# Patient Record
Sex: MALE | Race: WHITE | NOT HISPANIC OR LATINO
[De-identification: names, ages, dates, MRNs, and addresses within clinical notes are randomized per-mention and may not be internally consistent; named-entity substitution may affect disease eponyms.]

---

## 2024-05-01 ENCOUNTER — APPOINTMENT (OUTPATIENT)
Dept: UROLOGY | Facility: CLINIC | Age: 34
End: 2024-05-01
Payer: COMMERCIAL

## 2024-05-01 VITALS — SYSTOLIC BLOOD PRESSURE: 160 MMHG | DIASTOLIC BLOOD PRESSURE: 99 MMHG | TEMPERATURE: 97.5 F | HEART RATE: 66 BPM

## 2024-05-01 DIAGNOSIS — R82.998 OTHER ABNORMAL FINDINGS IN URINE: ICD-10-CM

## 2024-05-01 PROBLEM — Z00.00 ENCOUNTER FOR PREVENTIVE HEALTH EXAMINATION: Status: ACTIVE | Noted: 2024-05-01

## 2024-05-01 PROCEDURE — 99204 OFFICE O/P NEW MOD 45 MIN: CPT

## 2024-05-04 LAB
APPEARANCE: CLEAR
BACTERIA UR CULT: NORMAL
BACTERIA: NEGATIVE /HPF
BILIRUBIN URINE: NEGATIVE
BLOOD URINE: NEGATIVE
CAST: 0 /LPF
COLOR: YELLOW
EPITHELIAL CELLS: 0 /HPF
GLUCOSE QUALITATIVE U: NEGATIVE MG/DL
KETONES URINE: NEGATIVE MG/DL
LEUKOCYTE ESTERASE URINE: NEGATIVE
MICROSCOPIC-UA: NORMAL
NITRITE URINE: NEGATIVE
PH URINE: 6.5
PROTEIN URINE: NEGATIVE MG/DL
RED BLOOD CELLS URINE: 0 /HPF
SPECIFIC GRAVITY URINE: 1.01
UROBILINOGEN URINE: 0.2 MG/DL
WHITE BLOOD CELLS URINE: 0 /HPF

## 2024-05-04 NOTE — ASSESSMENT
[FreeTextEntry1] : 34 year old with 2 RBC/hpf and uric acid crystals in urine. Completely asymptomatic, no stone history. We discussed AUA guidelines for microhematuria. Microhematuria is defined as define microhematuria as 3 red blood cells per high-power field on microscopic evaluation of a single, properly collected urine specimen. Patient does not meet criteria. Work-up not indicated at this time. Also discussed crystalluria in absence of stones not worrisome. Offered renal US today - patient would like to hold off for now but if present on repeat urine will obtain.  - UA, culture - if above wnl fu as needed - if crystals still present will proceed with US to ro stones - no PCP --> discussed establishing care with Dr. Sánchez

## 2024-05-04 NOTE — HISTORY OF PRESENT ILLNESS
[FreeTextEntry1] : Name SOLOMON LAIRD  MRN [id number]   1990  Contact Number: [phone] ----------------------------------------------------------------------------------------------------------------------------------------- Date of First Visit: 24 Referring Provider/PCP: none -----------------------------------------------------------------------------------------------------------------------------------------  CC: microhematuria, uric acid crystals  History of Present Illness: SOLOMON LAIRD is a 34 year old male who presents for evaluation uric acid crystals. UA 24 blood+ but 0-2 RBC, uric acid crystal moderate. Patient reports had fever, vomiting, diarrhea at the time and went to urgent care. Reports was very dehtdrated at the time but no urinary symptoms. Denies history of urinary issues, history of kidney stones.  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 Personal history of cancer: no  History of cyclophosphamide/ifosfamide chemotherapy: no Family history of urothelial or other genitourinary cancers: no  IPSS 2 QOL 0 SONU 19  PMH: HTN, asthma PSH: none Family History: no  malignancies Social: , no children, runs an [employer], never smoker, social alcohol, +marijuana Meds: amlodipine, QVAR redihaler, albuterol Allergies: NKDA ROS: no current fevers, chills, flank pain ----------------------------------------------------------------------------------------------------------------------------------------- 24: UA nitrite neg LE neg occult blood, 0-2 RBC, + uric acid crystals

## 2024-05-08 ENCOUNTER — NON-APPOINTMENT (OUTPATIENT)
Age: 34
End: 2024-05-08

## 2024-05-14 ENCOUNTER — NON-APPOINTMENT (OUTPATIENT)
Age: 34
End: 2024-05-14

## 2024-05-15 ENCOUNTER — APPOINTMENT (OUTPATIENT)
Dept: INTERNAL MEDICINE | Facility: CLINIC | Age: 34
End: 2024-05-15
Payer: COMMERCIAL

## 2024-05-15 VITALS
SYSTOLIC BLOOD PRESSURE: 148 MMHG | BODY MASS INDEX: 29.12 KG/M2 | TEMPERATURE: 98.2 F | HEIGHT: 72 IN | HEART RATE: 68 BPM | DIASTOLIC BLOOD PRESSURE: 88 MMHG | WEIGHT: 215 LBS

## 2024-05-15 DIAGNOSIS — Z78.9 OTHER SPECIFIED HEALTH STATUS: ICD-10-CM

## 2024-05-15 DIAGNOSIS — Z82.69 FAMILY HISTORY OF OTHER DISEASES OF THE MUSCULOSKELETAL SYSTEM AND CONNECTIVE TISSUE: ICD-10-CM

## 2024-05-15 DIAGNOSIS — R63.5 ABNORMAL WEIGHT GAIN: ICD-10-CM

## 2024-05-15 DIAGNOSIS — Z87.09 PERSONAL HISTORY OF OTHER DISEASES OF THE RESPIRATORY SYSTEM: ICD-10-CM

## 2024-05-15 DIAGNOSIS — Z82.61 FAMILY HISTORY OF ARTHRITIS: ICD-10-CM

## 2024-05-15 DIAGNOSIS — Z82.49 FAMILY HISTORY OF ISCHEMIC HEART DISEASE AND OTHER DISEASES OF THE CIRCULATORY SYSTEM: ICD-10-CM

## 2024-05-15 DIAGNOSIS — I10 ESSENTIAL (PRIMARY) HYPERTENSION: ICD-10-CM

## 2024-05-15 PROCEDURE — G0447 BEHAVIOR COUNSEL OBESITY 15M: CPT | Mod: 59

## 2024-05-15 PROCEDURE — 99203 OFFICE O/P NEW LOW 30 MIN: CPT | Mod: 25

## 2024-05-15 PROCEDURE — 36415 COLL VENOUS BLD VENIPUNCTURE: CPT

## 2024-05-15 PROCEDURE — G0444 DEPRESSION SCREEN ANNUAL: CPT | Mod: 59

## 2024-05-15 PROCEDURE — G2211 COMPLEX E/M VISIT ADD ON: CPT | Mod: NC,1L

## 2024-05-15 RX ORDER — ALBUTEROL SULFATE 90 UG/1
108 (90 BASE) INHALANT RESPIRATORY (INHALATION)
Qty: 54 | Refills: 3 | Status: ACTIVE | COMMUNITY
Start: 2024-05-15 | End: 1900-01-01

## 2024-05-15 RX ORDER — BECLOMETHASONE DIPROPIONATE HFA 40 UG/1
40 AEROSOL, METERED RESPIRATORY (INHALATION)
Refills: 0 | Status: DISCONTINUED | COMMUNITY
End: 2024-05-15

## 2024-05-15 RX ORDER — AMLODIPINE AND OLMESARTAN MEDOXOMIL 5; 40 MG/1; MG/1
5-40 TABLET ORAL
Refills: 0 | Status: ACTIVE | COMMUNITY

## 2024-05-15 RX ORDER — BECLOMETHASONE DIPROPIONATE HFA 40 UG/1
40 AEROSOL, METERED RESPIRATORY (INHALATION) TWICE DAILY
Qty: 1 | Refills: 3 | Status: ACTIVE | COMMUNITY
Start: 2024-05-15 | End: 1900-01-01

## 2024-05-15 NOTE — PHYSICAL EXAM
[No Acute Distress] : no acute distress [Well Developed] : well developed [Well-Appearing] : well-appearing [Normal Sclera/Conjunctiva] : normal sclera/conjunctiva [Normal Outer Ear/Nose] : the outer ears and nose were normal in appearance [Supple] : supple [No Respiratory Distress] : no respiratory distress  [No Accessory Muscle Use] : no accessory muscle use [Clear to Auscultation] : lungs were clear to auscultation bilaterally [Normal Rate] : normal rate  [Regular Rhythm] : with a regular rhythm [Normal S1, S2] : normal S1 and S2 [No Murmur] : no murmur heard [No Edema] : there was no peripheral edema [Soft] : abdomen soft [Non Tender] : non-tender [Non-distended] : non-distended [No Rash] : no rash [Coordination Grossly Intact] : coordination grossly intact [No Focal Deficits] : no focal deficits [Normal Gait] : normal gait

## 2024-05-16 NOTE — HEALTH RISK ASSESSMENT
[Yes] : Yes [2 - 4 times a month (2 pts)] : 2-4 times a month (2 points) [1 or 2 (0 pts)] : 1 or 2 (0 points) [Less than monthly (1 pt)] : Less than monthly (1 point) [0] : 2) Feeling down, depressed, or hopeless: Not at all (0) [PHQ-2 Negative - No further assessment needed] : PHQ-2 Negative - No further assessment needed [HIV Test offered] : HIV Test offered [Hepatitis C test offered] : Hepatitis C test offered [Never] : Never [Audit-CScore] : 3 [de-identified] : See HPI [de-identified] : See HPI

## 2024-05-16 NOTE — PLAN
[FreeTextEntry1] : #HTN - Discussed DASH diet, low dietary salt intake, cutting down alcohol, increasing physical activity, prioritizing good sleep - Advised to measure BP at home BID every day for a week, will prescribe machine - Follow up with me via tele-visit in1 week - Will check lipid profile  - UA 5/1 reviewed: no proteinuria/glucose/ketones   #Weight management - Discussed concept of calorie deficit to lose weight - Clarified that there's no evidence that carbs inherently cause weight gain in the setting of controlled calorie intake - Advised increasing physical activity and prioritizing sleep - Advised to follow a trusted  on social media to immerse himself in the knowledge needed to embrace lifestyle measures that will help him get to a more healthy weight sustainably  - Will check TSH  #Asthma - mild, intermittent, stable, currently asymptomatic  - Will refill QVar and albuterol  - Advised annual flu shot this fall/winter, keeping himself updated with COVID booster as indicated  #HCM - CBC, CMP, HIV, Hep C today

## 2024-05-16 NOTE — COUNSELING
[Behavioral health counseling provided] : Behavioral health counseling provided [Sleep ___ hours/day] : Sleep [unfilled] hours/day [Engage in a relaxing activity] : Engage in a relaxing activity [Plan in advance] : Plan in advance [Structured Weight Management Program suggested:] : Structured weight management program suggested [Encouraged to increase physical activity] : Encouraged to increase physical activity [None] : None [Good understanding] : Patient has a good understanding of lifestyle changes and steps needed to achieve self management goal [FreeTextEntry1] : Rodger Steve [FreeTextEntry2] : See HPI [FreeTextEntry4] : 14

## 2024-05-16 NOTE — HISTORY OF PRESENT ILLNESS
[FreeTextEntry1] : He saw Dr. Ruiz for crystalluria earlier this month and was advised by her to establish care with me as he didn't have a PCP History of HTN and asthma Wants to discuss weight gain [de-identified] : #HTN /99 on 5/1 when he saw Dr. Ruiz and 148/88 today Says used to be 120-125/80-90 at home when he last checked although that was a while ago; he was told he may have white coat HTN Says he was prescribed amlodipine-olmesartan at  in Dec but ran out of refills as he didn't have a PCP  Diet: Intermittent fasting 10p-noon as he's trying to lose weight, salad with grilled chicken & dressing, cooking dinner chicken/asparagus, meat with red sauce, eats salted nuts  Exercise: Works in an art studio, is a , on his feet the whole day, lifts heavy weights, walks a lot, has an indoor bike Sleep: Not doing well there, goes to bed 11p-12a, wakes 6a, doesn't feel fresh in AM  #Weight gain He's interested in losing excessive weight Has been trying dietary modifications    #Asthma, mild intermittent  Dx when young Has a cat he's allergic to but wants to keep it More problematic in winter    Takes QVar prescribed by same  last winter with enough refills, has PRN albuterol   #Crystalluria, asymptomatic Cleared by Dr. Ruiz, no follow up needed

## 2024-05-21 LAB
ALBUMIN SERPL ELPH-MCNC: 5 G/DL
ALP BLD-CCNC: 49 U/L
ALT SERPL-CCNC: 25 U/L
ANION GAP SERPL CALC-SCNC: 12 MMOL/L
AST SERPL-CCNC: 21 U/L
BASOPHILS # BLD AUTO: 0.03 K/UL
BASOPHILS NFR BLD AUTO: 0.4 %
BILIRUB SERPL-MCNC: 0.4 MG/DL
BUN SERPL-MCNC: 13 MG/DL
CALCIUM SERPL-MCNC: 9.6 MG/DL
CHLORIDE SERPL-SCNC: 102 MMOL/L
CHOLEST SERPL-MCNC: 208 MG/DL
CO2 SERPL-SCNC: 25 MMOL/L
CREAT SERPL-MCNC: 0.82 MG/DL
EGFR: 117 ML/MIN/1.73M2
EOSINOPHIL # BLD AUTO: 0.23 K/UL
EOSINOPHIL NFR BLD AUTO: 3.3 %
GLUCOSE SERPL-MCNC: 96 MG/DL
HCT VFR BLD CALC: 47.1 %
HCV AB SER QL: NONREACTIVE
HCV S/CO RATIO: 0.07 S/CO
HDLC SERPL-MCNC: 74 MG/DL
HGB BLD-MCNC: 15 G/DL
HIV1+2 AB SPEC QL IA.RAPID: NONREACTIVE
IMM GRANULOCYTES NFR BLD AUTO: 0.3 %
LDLC SERPL CALC-MCNC: 113 MG/DL
LYMPHOCYTES # BLD AUTO: 1.75 K/UL
LYMPHOCYTES NFR BLD AUTO: 25.4 %
MAN DIFF?: NORMAL
MCHC RBC-ENTMCNC: 29.4 PG
MCHC RBC-ENTMCNC: 31.8 GM/DL
MCV RBC AUTO: 92.4 FL
MONOCYTES # BLD AUTO: 0.33 K/UL
MONOCYTES NFR BLD AUTO: 4.8 %
NEUTROPHILS # BLD AUTO: 4.53 K/UL
NEUTROPHILS NFR BLD AUTO: 65.8 %
NONHDLC SERPL-MCNC: 135 MG/DL
PLATELET # BLD AUTO: 313 K/UL
POTASSIUM SERPL-SCNC: 4.8 MMOL/L
PROT SERPL-MCNC: 7.8 G/DL
RBC # BLD: 5.1 M/UL
RBC # FLD: 15.2 %
SODIUM SERPL-SCNC: 139 MMOL/L
TRIGL SERPL-MCNC: 125 MG/DL
TSH SERPL-ACNC: 1.44 UIU/ML
WBC # FLD AUTO: 6.89 K/UL

## 2024-05-22 ENCOUNTER — APPOINTMENT (OUTPATIENT)
Dept: INTERNAL MEDICINE | Facility: CLINIC | Age: 34
End: 2024-05-22

## 2024-05-22 DIAGNOSIS — E78.5 HYPERLIPIDEMIA, UNSPECIFIED: ICD-10-CM

## 2024-05-22 DIAGNOSIS — Z82.49 FAMILY HISTORY OF ISCHEMIC HEART DISEASE AND OTHER DISEASES OF THE CIRCULATORY SYSTEM: ICD-10-CM

## 2024-05-22 PROCEDURE — 99213 OFFICE O/P EST LOW 20 MIN: CPT

## 2024-05-22 NOTE — PLAN
[FreeTextEntry1] : #Stage 1-2 Hypertension - Discussed DASH diet, low dietary salt intake, cutting down alcohol, increasing physical activity, prioritizing good sleep - Trial of lifestyle modification for 6 months  #Hyperlipidemia -  Fam Hx of cardiovascular disease (MI, stroke) in maternal uncles in their 40s-50s - No statin indicated yet - Discussed dietary changes with low saturated fat intake and high dietary fiber  #Weight gain - Discussed concept of calorie deficit to lose weight - Discussed protein and fiber optimization - Advised increasing physical activity: increase walking, use stairs, exercise snacks, start weight lifting safely; he has a [employer] bike and will explore their coaching - Advised prioritizing sleep - Dietitian referral today - Add on A1C  #Finger burn Burnt his finger yesterday Developed a blister, mild surrounding erythema - Advised mupirocin  Advised to sign up on patient portal Follow up in 3-4 months

## 2024-05-22 NOTE — HISTORY OF PRESENT ILLNESS
[Verbal consent obtained from patient] : the patient, [unfilled] [FreeTextEntry1] : #Hypertension #Hyperlipidemia #Weight gain [de-identified] : #Hypertension We discussed his at-home measurements today SBP readings are 119-133, DBP 86-98 One reading 153/98 this AM  #Hyperlipidemia  Fam Hx of cardiovascular disease (MI, stroke) in maternal uncles in their 40s-50s  #Weight gain Seeking assistance with weight loss  #Finger burn Burnt his finger yesterday Developed a blister

## 2025-07-11 ENCOUNTER — RX RENEWAL (OUTPATIENT)
Age: 35
End: 2025-07-11

## 2025-09-03 ENCOUNTER — RX RENEWAL (OUTPATIENT)
Age: 35
End: 2025-09-03